# Patient Record
Sex: FEMALE | Race: WHITE | NOT HISPANIC OR LATINO | Employment: FULL TIME | URBAN - METROPOLITAN AREA
[De-identification: names, ages, dates, MRNs, and addresses within clinical notes are randomized per-mention and may not be internally consistent; named-entity substitution may affect disease eponyms.]

---

## 2017-01-10 ENCOUNTER — OFFICE VISIT (OUTPATIENT)
Dept: PEDIATRICS | Facility: CLINIC | Age: 32
End: 2017-01-10
Payer: COMMERCIAL

## 2017-01-10 VITALS
OXYGEN SATURATION: 99 % | BODY MASS INDEX: 25.04 KG/M2 | HEART RATE: 52 BPM | SYSTOLIC BLOOD PRESSURE: 116 MMHG | WEIGHT: 147 LBS | DIASTOLIC BLOOD PRESSURE: 69 MMHG | TEMPERATURE: 98.3 F

## 2017-01-10 DIAGNOSIS — L98.9 SKIN LESION: Primary | ICD-10-CM

## 2017-01-10 PROCEDURE — 11300 SHAVE SKIN LESION 0.5 CM/<: CPT | Performed by: FAMILY MEDICINE

## 2017-01-10 NOTE — MR AVS SNAPSHOT
After Visit Summary   1/10/2017    Isabel Anthony    MRN: [id number]           Patient Information     Date Of Birth          1985        Visit Information        Provider Department      1/10/2017 8:40 AM Kary Rosen MD; PROC RM 1 [facility]        Today's Diagnoses     Skin lesion    -  1        Follow-ups after your visit        Who to contact     If you have questions or need follow up information about today's clinic visit or your schedule please contact [facility] directly at [phone].  Normal or non-critical lab and imaging results will be communicated to you by MyChart, letter or phone within 4 business days after the clinic has received the results. If you do not hear from us within 7 days, please contact the clinic through MyChart or phone. If you have a critical or abnormal lab result, we will notify you by phone as soon as possible.  Submit refill requests through [employer] or call your pharmacy and they will forward the refill request to us. Please allow 3 business days for your refill to be completed.          Additional Information About Your Visit        MyChart Information     [employer] is an electronic gateway that provides easy, online access to your medical records. With [employer], you can request a clinic appointment, read your test results, renew a prescription or communicate with your care team.     To sign up for [employer] visit the website at [email or web address]   You will be asked to enter the access code listed below, as well as some personal information. Please follow the directions to create your username and password.     Your access code is: [email or web address]  Expires: 4/10/2017  9:15 AM     Your access code will  in 90 days. If you need help or a new code, please contact your [facility] or call [phone] for assistance.        Care EveryWhere ID     This is your Care EveryWhere  ID. This could be used by other organizations to access your [facility] medical records  [id number]        Your Vitals Were     Pulse Temperature Pulse Oximetry             52 98.3  F (36.8  C) (Oral) 99%          Blood Pressure from Last 3 Encounters:   01/10/17 116/69   12/26/16 106/64   01/26/16 120/78    Weight from Last 3 Encounters:   01/10/17 147 lb (66.679 kg)   12/26/16 147 lb 1.6 oz (66.724 kg)   01/26/16 141 lb 8 oz (64.184 kg)              We Performed the Following     SHAV SKIN LESION TRUNK/ARM/LEG <=0.5 CM        Primary Care Provider Office Phone # Fax #    Kary Rosen [phone]       [facility] [id number]  [facility] [address]        Thank you!     Thank you for choosing [facility]  for your care. Our goal is always to provide you with excellent care. Hearing back from our patients is one way we can continue to improve our services. Please take a few minutes to complete the written survey that you may receive in the mail after your visit with us. Thank you!             Your Updated Medication List - Protect others around you: Learn how to safely use, store and throw away your medicines at www.disposemymeds.org.          This list is accurate as of: 1/10/17  9:15 AM.  Always use your most recent med list.                   Brand Name Dispense Instructions for use    MAGNESIUM OXIDE PO      Take 500 mg by mouth daily       OMEGA-3 FISH OIL PO      Take 2 g by mouth daily       paragard intrauterine copper      1 each by Intrauterine route once Placed 5/2010 per patient

## 2017-01-10 NOTE — NURSING NOTE
"Chief Complaint   Patient presents with     Lesion Removal       Initial /69 mmHg  Pulse 52  Temp(Src) 98.3  F (36.8  C) (Oral)  Wt 147 lb (66.679 kg)  SpO2 99% Estimated body mass index is 25.04 kg/(m^2) as calculated from the following:    Height as of 1/26/16: 5' 4.25\" (1.632 m).    Weight as of this encounter: 147 lb (66.679 kg).  BP completed using cuff size: rula Gutierrez CMA      "

## 2017-01-10 NOTE — PROGRESS NOTES
SUBJECTIVE:                                                    Isabel Anthony is a 31 year old female who presents to clinic today for the following health issues:      Lesion Removal from left breast  Patient is here for removal of irritating skin lesion over the skin of the left breast        Problem list and histories reviewed & adjusted, as indicated.  Additional history: as documented    Patient Active Problem List   Diagnosis     CARDIOVASCULAR SCREENING; LDL GOAL LESS THAN 160     Surveillance of intrauterine contraceptive device, -PARAGARD, May, 2010     Other fatigue     Low back strain, initial encounter     Family history of heart disease     Past Surgical History   Procedure Laterality Date     Hc tooth extraction w/forcep         Social History   Substance Use Topics     Smoking status: Never Smoker      Smokeless tobacco: Never Used     Alcohol Use: 0.0 oz/week     0 Standard drinks or equivalent per week      Comment: occasional/social     Family History   Problem Relation Age of Onset     Coronary Artery Disease Early Onset Father      at age 48     Other Cancer Paternal Grandfather      bladder cancer     Prostate Cancer Maternal Grandfather      Other Cancer Maternal Aunt      cervical cancer      Other Cancer Maternal Aunt      cervical cancer     Thyroid Disease Paternal Grandmother          Current Outpatient Prescriptions   Medication Sig Dispense Refill     paragard intrauterine copper 1 each by Intrauterine route once Placed 5/2010 per patient       Omega-3 Fatty Acids (OMEGA-3 FISH OIL PO) Take 2 g by mouth daily        MAGNESIUM OXIDE PO Take 500 mg by mouth daily        Allergies   Allergen Reactions     Amoxicillin Rash     Reaction as infant     Recent Labs   Lab Test  01/26/16   0810   LDL  113*   HDL  49*   TRIG  50   TSH  1.55      BP Readings from Last 3 Encounters:   01/10/17 116/69   12/26/16 106/64   01/26/16 120/78    Wt Readings from Last 3 Encounters:   01/10/17 147 lb  (66.679 kg)   12/26/16 147 lb 1.6 oz (66.724 kg)   01/26/16 141 lb 8 oz (64.184 kg)                  Labs reviewed in EPIC  Problem list, Medication list, Allergies, and Medical/Social/Surgical histories reviewed in [facility] and updated as appropriate.    ROS:  C: NEGATIVE for fever, chills, change in weight  INTEGUMENTARY/SKIN: as above    OBJECTIVE:                                                    /69 mmHg  Pulse 52  Temp(Src) 98.3  F (36.8  C) (Oral)  Wt 147 lb (66.679 kg)  SpO2 99%  Body mass index is 25.04 kg/(m^2).  GENERAL: healthy, alert and no distress  SKIN: 3 mm skin-colored, fleshy skin lesion over the skin of the upper quadrant of left breast    Diagnostic Test Results:  none      ASSESSMENT/PLAN:                                                            1. Skin lesion  Left breast skin- 3mm  After explaining the risks and benefits of the procedure and after getting written consent under aseptic precuations and local anesthesia with 0.5cc of 2% lidocaine and epinephrine, the lesion was removed with a shave blade.   Local bleeding was controlled with topical drysol.   Wound was dressed with bacitracin.   Wound care instructions given.rtc for concerns of cutaneous infection.  patient tolerated the procedure well.  No complications noted    - SHAV SKIN LESION TRUNK/ARM/LEG <=0.5 CM    Chart documentation done in part with Dragon Voice recognition Software. Although reviewed after completion, some word and grammatical error may remain.    See Patient Instructions    Kary Rosen MD  [facility]

## 2020-07-31 ENCOUNTER — MYC MEDICAL ADVICE (OUTPATIENT)
Dept: PEDIATRICS | Facility: CLINIC | Age: 35
End: 2020-07-31

## 2020-07-31 NOTE — TELEPHONE ENCOUNTER
Please assist with scheduling when able.    Leslie Scott, RN, BSN, PHN  [facility]

## 2020-08-11 ENCOUNTER — OFFICE VISIT (OUTPATIENT)
Dept: PEDIATRICS | Facility: CLINIC | Age: 35
End: 2020-08-11
Payer: COMMERCIAL

## 2020-08-11 VITALS
HEIGHT: 64 IN | DIASTOLIC BLOOD PRESSURE: 66 MMHG | WEIGHT: 158.1 LBS | HEART RATE: 54 BPM | TEMPERATURE: 98.6 F | BODY MASS INDEX: 26.99 KG/M2 | OXYGEN SATURATION: 99 % | SYSTOLIC BLOOD PRESSURE: 124 MMHG

## 2020-08-11 DIAGNOSIS — Z30.431 ENCOUNTER FOR MANAGEMENT OF INTRAUTERINE CONTRACEPTIVE DEVICE (IUD), UNSPECIFIED IUD MANAGEMENT TYPE: ICD-10-CM

## 2020-08-11 DIAGNOSIS — L68.0 HIRSUTISM: Primary | ICD-10-CM

## 2020-08-11 PROCEDURE — 99203 OFFICE O/P NEW LOW 30 MIN: CPT | Performed by: FAMILY MEDICINE

## 2020-08-11 ASSESSMENT — MIFFLIN-ST. JEOR: SCORE: 1403.17

## 2020-08-11 ASSESSMENT — PAIN SCALES - GENERAL: PAINLEVEL: NO PAIN (0)

## 2020-08-11 NOTE — PROGRESS NOTES
Subjective     Isabel Anthony is a 35 year old female who presents to clinic today for the following health issues:    HPI       Establish Care - re-establish care, IUD consult and discuss labs for hormones due to increase exhaustion in last couple of years    Patient was last seen by this writer in 2017  Her last physical was in 2016  Patient is  2 para 2, last childbirth-10 years ago, had a ParaGard IUD since then  She is going through divorce, has been  from her  for the past 2 years  Patient is wishing to have another ParaGard inserted for contraception  She is also having concerns with excessive hair growth on the sideburns, chin and having very thick coarse hair around the nipple and on the upper extremities  Patient is worried about having hormonal imbalance and wants to get hormonal check done  She denies having personal history of thyroid disorder but does have family history of hypothyroidism in mother  Patient denies change in voice, acne problems  She has been having regular menstrual cycles every 4 weeks,.  Lasting for 5 to 7 days with heavy flow during the first 4 days of the cycle associated with pelvic cramping due to the  ParaGard IUD  Patient denies underlying history of polycystic ovarian disease            Patient Active Problem List   Diagnosis     CARDIOVASCULAR SCREENING; LDL GOAL LESS THAN 160     Surveillance of intrauterine contraceptive device, -PARAGARD, May, 2010     Other fatigue     Low back strain, initial encounter     Family history of heart disease     Hirsutism     Past Surgical History:   Procedure Laterality Date     HC TOOTH EXTRACTION W/FORCEP         Social History     Tobacco Use     Smoking status: Never Smoker     Smokeless tobacco: Never Used   Substance Use Topics     Alcohol use: Yes     Alcohol/week: 0.0 standard drinks     Comment: occasional/social     Family History   Problem Relation Age of Onset     Coronary Artery Disease Early Onset  "Father         at age 48     Other Cancer Paternal Grandfather         bladder cancer     Prostate Cancer Maternal Grandfather      Other Cancer Maternal Aunt         cervical cancer      Other Cancer Maternal Aunt         cervical cancer     Thyroid Disease Paternal Grandmother          Current Outpatient Medications   Medication Sig Dispense Refill     MAGNESIUM OXIDE PO Take 500 mg by mouth daily        Omega-3 Fatty Acids (OMEGA-3 FISH OIL PO) Take 2 g by mouth daily        paragard intrauterine copper 1 each by Intrauterine route once Placed 5/2010 per patient       Allergies   Allergen Reactions     Amoxicillin Rash     Reaction as infant     Recent Labs   Lab Test 01/26/16  0810   *   HDL 49*   TRIG 50   TSH 1.55      BP Readings from Last 3 Encounters:   08/11/20 124/66   01/10/17 116/69   12/26/16 106/64    Wt Readings from Last 3 Encounters:   08/11/20 71.7 kg (158 lb 1.6 oz)   01/10/17 66.7 kg (147 lb)   12/26/16 66.7 kg (147 lb 1.6 oz)                    Reviewed and updated as needed this visit by Provider         Review of Systems   CONSTITUTIONAL: NEGATIVE for fever, chills, change in weight  INTEGUMENTARY/SKIN: as above  RESP: NEGATIVE for significant cough or SOB  CV: NEGATIVE for chest pain, palpitations or peripheral edema  GI: NEGATIVE for nausea, abdominal pain, heartburn, or change in bowel habits  : normal menstrual cycles  MUSCULOSKELETAL: NEGATIVE for significant arthralgias or myalgia  ENDOCRINE: NEGATIVE for temperature intolerance, skin/hair changes  PSYCHIATRIC: NEGATIVE for changes in mood or affect      Objective    /66 (BP Location: Right arm, Patient Position: Sitting, Cuff Size: Adult Regular)   Pulse 54   Temp 98.6  F (37  C) (Temporal)   Ht 1.619 m (5' 3.75\")   Wt 71.7 kg (158 lb 1.6 oz)   LMP 08/05/2020   SpO2 99%   BMI 27.35 kg/m    Body mass index is 27.35 kg/m .  Physical Exam   GENERAL: healthy, alert and no distress  NECK: no adenopathy, no asymmetry, " "masses, or scars and thyroid normal to palpation  RESP: lungs clear to auscultation - no rales, rhonchi or wheezes  CV: regular rate and rhythm, normal S1 S2, no S3 or S4, no murmur, click or rub, no peripheral edema and peripheral pulses strong  ABDOMEN: soft, nontender, no hepatosplenomegaly, no masses and bowel sounds normal  MS: no gross musculoskeletal defects noted, no edema  SKIN: Coarse thick hair on the upper extremities  Patient has shaved her sideburn and chin  PSYCH: mentation appears normal, affect normal/bright    Diagnostic Test Results:  Labs reviewed in Epic        Assessment & Plan     1. Hirsutism  Patient is overdue for a physical, she is coming to have the physical, pelvic exam and Pap next week   will get labs for hirsutism work-up along with a fasting labs at that time  Will f/u on results and call with recommendations.  Patient verbalised understanding and is agreeable to the plan.        2. Encounter for management of intrauterine contraceptive device (IUD), unspecified IUD management type  She is currently having ParaGard IUD  Patient's LMP is a week ago  She will schedule for IUD removal and reinsertion on the fifth or sixth the day of her next menstrual cycle       BMI:   Estimated body mass index is 27.35 kg/m  as calculated from the following:    Height as of this encounter: 1.619 m (5' 3.75\").    Weight as of this encounter: 71.7 kg (158 lb 1.6 oz).   Weight management plan: Discussed healthy diet and exercise guidelines        Regular exercise  Chart documentation done in part with Dragon Voice recognition Software. Although reviewed after completion, some word and grammatical error may remain.    See Patient Instructions    No follow-ups on file.    Kary Rosen MD  [facility]    "

## 2020-08-16 DIAGNOSIS — Z00.00 ROUTINE GENERAL MEDICAL EXAMINATION AT A HEALTH CARE FACILITY: Primary | ICD-10-CM

## 2020-08-16 DIAGNOSIS — Z13.0 SCREENING FOR DEFICIENCY ANEMIA: ICD-10-CM

## 2020-08-16 DIAGNOSIS — Z13.29 SCREENING FOR THYROID DISORDER: ICD-10-CM

## 2020-08-16 DIAGNOSIS — Z13.6 CARDIOVASCULAR SCREENING; LDL GOAL LESS THAN 160: ICD-10-CM

## 2020-08-16 DIAGNOSIS — Z13.1 SCREENING FOR DIABETES MELLITUS (DM): ICD-10-CM

## 2020-08-16 DIAGNOSIS — L68.0 HIRSUTISM: ICD-10-CM

## 2020-08-19 ENCOUNTER — OFFICE VISIT (OUTPATIENT)
Dept: PEDIATRICS | Facility: CLINIC | Age: 35
End: 2020-08-19
Payer: COMMERCIAL

## 2020-08-19 VITALS
DIASTOLIC BLOOD PRESSURE: 81 MMHG | WEIGHT: 156.3 LBS | TEMPERATURE: 97.4 F | OXYGEN SATURATION: 98 % | SYSTOLIC BLOOD PRESSURE: 120 MMHG | BODY MASS INDEX: 27.04 KG/M2 | HEART RATE: 55 BPM

## 2020-08-19 DIAGNOSIS — Z00.00 ROUTINE GENERAL MEDICAL EXAMINATION AT A HEALTH CARE FACILITY: Primary | ICD-10-CM

## 2020-08-19 DIAGNOSIS — Z13.29 SCREENING FOR THYROID DISORDER: ICD-10-CM

## 2020-08-19 DIAGNOSIS — Z13.1 SCREENING FOR DIABETES MELLITUS (DM): ICD-10-CM

## 2020-08-19 DIAGNOSIS — Z13.6 CARDIOVASCULAR SCREENING; LDL GOAL LESS THAN 160: ICD-10-CM

## 2020-08-19 DIAGNOSIS — D22.9 MULTIPLE PIGMENTED NEVI: ICD-10-CM

## 2020-08-19 DIAGNOSIS — Z30.431 SURVEILLANCE OF INTRAUTERINE CONTRACEPTIVE DEVICE: ICD-10-CM

## 2020-08-19 DIAGNOSIS — Z12.4 CERVICAL CANCER SCREENING: ICD-10-CM

## 2020-08-19 DIAGNOSIS — L68.0 HIRSUTISM: ICD-10-CM

## 2020-08-19 DIAGNOSIS — Z13.0 SCREENING FOR DEFICIENCY ANEMIA: ICD-10-CM

## 2020-08-19 DIAGNOSIS — Z00.00 ROUTINE GENERAL MEDICAL EXAMINATION AT A HEALTH CARE FACILITY: ICD-10-CM

## 2020-08-19 DIAGNOSIS — Z11.4 SCREENING FOR HUMAN IMMUNODEFICIENCY VIRUS: ICD-10-CM

## 2020-08-19 LAB
ALBUMIN SERPL-MCNC: 4.3 G/DL (ref 3.4–5)
ALP SERPL-CCNC: 46 U/L (ref 40–150)
ALT SERPL W P-5'-P-CCNC: 36 U/L (ref 0–50)
ANION GAP SERPL CALCULATED.3IONS-SCNC: 4 MMOL/L (ref 3–14)
AST SERPL W P-5'-P-CCNC: 23 U/L (ref 0–45)
BASOPHILS # BLD AUTO: 0 10E9/L (ref 0–0.2)
BASOPHILS NFR BLD AUTO: 0.5 %
BILIRUB SERPL-MCNC: 0.5 MG/DL (ref 0.2–1.3)
BUN SERPL-MCNC: 14 MG/DL (ref 7–30)
CALCIUM SERPL-MCNC: 8.6 MG/DL (ref 8.5–10.1)
CHLORIDE SERPL-SCNC: 104 MMOL/L (ref 94–109)
CHOLEST SERPL-MCNC: 190 MG/DL
CO2 SERPL-SCNC: 29 MMOL/L (ref 20–32)
CREAT SERPL-MCNC: 0.92 MG/DL (ref 0.52–1.04)
DHEA-S SERPL-MCNC: 237 UG/DL (ref 35–430)
DIFFERENTIAL METHOD BLD: NORMAL
EOSINOPHIL # BLD AUTO: 0 10E9/L (ref 0–0.7)
EOSINOPHIL NFR BLD AUTO: 0.4 %
ERYTHROCYTE [DISTWIDTH] IN BLOOD BY AUTOMATED COUNT: 12.3 % (ref 10–15)
FSH SERPL-ACNC: 3.2 IU/L
GFR SERPL CREATININE-BSD FRML MDRD: 81 ML/MIN/{1.73_M2}
GLUCOSE SERPL-MCNC: 92 MG/DL (ref 70–99)
HCT VFR BLD AUTO: 39 % (ref 35–47)
HDLC SERPL-MCNC: 51 MG/DL
HGB BLD-MCNC: 13.3 G/DL (ref 11.7–15.7)
HIV 1+2 AB+HIV1 P24 AG SERPL QL IA: NONREACTIVE
IMM GRANULOCYTES # BLD: 0 10E9/L (ref 0–0.4)
IMM GRANULOCYTES NFR BLD: 0.4 %
LDLC SERPL CALC-MCNC: 123 MG/DL
LH SERPL-ACNC: 7.9 IU/L
LYMPHOCYTES # BLD AUTO: 1.9 10E9/L (ref 0.8–5.3)
LYMPHOCYTES NFR BLD AUTO: 33.5 %
MCH RBC QN AUTO: 30.9 PG (ref 26.5–33)
MCHC RBC AUTO-ENTMCNC: 34.1 G/DL (ref 31.5–36.5)
MCV RBC AUTO: 91 FL (ref 78–100)
MONOCYTES # BLD AUTO: 0.5 10E9/L (ref 0–1.3)
MONOCYTES NFR BLD AUTO: 8.4 %
NEUTROPHILS # BLD AUTO: 3.2 10E9/L (ref 1.6–8.3)
NEUTROPHILS NFR BLD AUTO: 56.8 %
NONHDLC SERPL-MCNC: 139 MG/DL
PLATELET # BLD AUTO: 197 10E9/L (ref 150–450)
POTASSIUM SERPL-SCNC: 4 MMOL/L (ref 3.4–5.3)
PROLACTIN SERPL-MCNC: 8 UG/L (ref 3–27)
PROT SERPL-MCNC: 7.2 G/DL (ref 6.8–8.8)
RBC # BLD AUTO: 4.31 10E12/L (ref 3.8–5.2)
SODIUM SERPL-SCNC: 137 MMOL/L (ref 133–144)
TRIGL SERPL-MCNC: 80 MG/DL
TSH SERPL DL<=0.005 MIU/L-ACNC: 2.14 MU/L (ref 0.4–4)
WBC # BLD AUTO: 5.6 10E9/L (ref 4–11)

## 2020-08-19 PROCEDURE — 87624 HPV HI-RISK TYP POOLED RSLT: CPT | Performed by: FAMILY MEDICINE

## 2020-08-19 PROCEDURE — 83001 ASSAY OF GONADOTROPIN (FSH): CPT | Performed by: FAMILY MEDICINE

## 2020-08-19 PROCEDURE — 87389 HIV-1 AG W/HIV-1&-2 AB AG IA: CPT | Performed by: FAMILY MEDICINE

## 2020-08-19 PROCEDURE — G0145 SCR C/V CYTO,THINLAYER,RESCR: HCPCS | Performed by: FAMILY MEDICINE

## 2020-08-19 PROCEDURE — 84270 ASSAY OF SEX HORMONE GLOBUL: CPT | Performed by: FAMILY MEDICINE

## 2020-08-19 PROCEDURE — 80050 GENERAL HEALTH PANEL: CPT | Performed by: FAMILY MEDICINE

## 2020-08-19 PROCEDURE — 83002 ASSAY OF GONADOTROPIN (LH): CPT | Performed by: FAMILY MEDICINE

## 2020-08-19 PROCEDURE — 80061 LIPID PANEL: CPT | Performed by: FAMILY MEDICINE

## 2020-08-19 PROCEDURE — 36415 COLL VENOUS BLD VENIPUNCTURE: CPT | Performed by: FAMILY MEDICINE

## 2020-08-19 PROCEDURE — 84146 ASSAY OF PROLACTIN: CPT | Performed by: FAMILY MEDICINE

## 2020-08-19 PROCEDURE — 82627 DEHYDROEPIANDROSTERONE: CPT | Performed by: FAMILY MEDICINE

## 2020-08-19 PROCEDURE — 84403 ASSAY OF TOTAL TESTOSTERONE: CPT | Performed by: FAMILY MEDICINE

## 2020-08-19 PROCEDURE — 99395 PREV VISIT EST AGE 18-39: CPT | Performed by: FAMILY MEDICINE

## 2020-08-19 ASSESSMENT — PAIN SCALES - GENERAL: PAINLEVEL: NO PAIN (0)

## 2020-08-19 NOTE — RESULT ENCOUNTER NOTE
Dear Isabel,  Your test results for HIV screening is negative, this is good and reassuring.   Let me know if you have any questions. Take care.  Kary Rosen MD

## 2020-08-19 NOTE — PATIENT INSTRUCTIONS
Schedule for skin consult      Preventive Health Recommendations  Female Ages 26 - 39  Yearly exam:   See your health care provider every year in order to    Review health changes.     Discuss preventive care.      Review your medicines if you your doctor has prescribed any.    Until age 30: Get a Pap test every three years (more often if you have had an abnormal result).    After age 30: Talk to your doctor about whether you should have a Pap test every 3 years or have a Pap test with HPV screening every 5 years.   You do not need a Pap test if your uterus was removed (hysterectomy) and you have not had cancer.  You should be tested each year for STDs (sexually transmitted diseases), if you're at risk.   Talk to your provider about how often to have your cholesterol checked.  If you are at risk for diabetes, you should have a diabetes test (fasting glucose).  Shots: Get a flu shot each year. Get a tetanus shot every 10 years.   Nutrition:     Eat at least 5 servings of fruits and vegetables each day.    Eat whole-grain bread, whole-wheat pasta and brown rice instead of white grains and rice.    Get adequate Calcium and Vitamin D.     Lifestyle    Exercise at least 150 minutes a week (30 minutes a day, 5 days of the week). This will help you control your weight and prevent disease.    Limit alcohol to one drink per day.    No smoking.     Wear sunscreen to prevent skin cancer.    See your dentist every six months for an exam and cleaning.

## 2020-08-19 NOTE — PROGRESS NOTES
SUBJECTIVE:   CC: Isabel Anthony is an 35 year old woman who presents for preventive health visit.     Healthy Habits:    Do you get at least three servings of calcium containing foods daily (dairy, green leafy vegetables, etc.)? yes    Amount of exercise or daily activities, outside of work: 2-3 day(s) per week    Problems taking medications regularly No    Medication side effects: No    Have you had an eye exam in the past two years? no    Do you see a dentist twice per year? yes    Do you have sleep apnea, excessive snoring or daytime drowsiness?no      -------------------------------------    Today's PHQ-2 Score:   PHQ-2 ( 1999 [employer]) 8/11/2020 1/26/2016   Q1: Little interest or pleasure in doing things 0 0   Q2: Feeling down, depressed or hopeless 0 0   PHQ-2 Score 0 0       Abuse: Current or Past(Physical, Sexual or Emotional)- No  Do you feel safe in your environment? Yes        Social History     Tobacco Use     Smoking status: Never Smoker     Smokeless tobacco: Never Used   Substance Use Topics     Alcohol use: Yes     Alcohol/week: 0.0 standard drinks     Comment: occasional/social     If you drink alcohol do you typically have >3 drinks per day or >7 drinks per week? No                     Reviewed orders with patient.  Reviewed health maintenance and updated orders accordingly - Yes  Lab work is in process  Labs reviewed in EPIC  BP Readings from Last 3 Encounters:   08/19/20 120/81   08/11/20 124/66   01/10/17 116/69    Wt Readings from Last 3 Encounters:   08/19/20 70.9 kg (156 lb 4.8 oz)   08/11/20 71.7 kg (158 lb 1.6 oz)   01/10/17 66.7 kg (147 lb)                  Patient Active Problem List   Diagnosis     CARDIOVASCULAR SCREENING; LDL GOAL LESS THAN 160     Surveillance of intrauterine contraceptive device, -PARAGARD, May, 2010     Other fatigue     Low back strain, initial encounter     Family history of heart disease     Hirsutism     Past Surgical History:   Procedure Laterality Date      HC TOOTH EXTRACTION W/FORCEP         Social History     Tobacco Use     Smoking status: Never Smoker     Smokeless tobacco: Never Used   Substance Use Topics     Alcohol use: Yes     Alcohol/week: 0.0 standard drinks     Comment: occasional/social     Family History   Problem Relation Age of Onset     Coronary Artery Disease Early Onset Father         at age 48     Other Cancer Paternal Grandfather         bladder cancer     Prostate Cancer Maternal Grandfather      Other Cancer Maternal Aunt         cervical cancer      Other Cancer Maternal Aunt         cervical cancer     Thyroid Disease Paternal Grandmother          Current Outpatient Medications   Medication Sig Dispense Refill     MAGNESIUM OXIDE PO Take 500 mg by mouth daily        Omega-3 Fatty Acids (OMEGA-3 FISH OIL PO) Take 2 g by mouth daily        paragard intrauterine copper 1 each by Intrauterine route once Placed 5/2010 per patient       Allergies   Allergen Reactions     Amoxicillin Rash     Reaction as infant     Recent Labs   Lab Test 08/19/20  0933 01/26/16  0810   * 113*   HDL 51 49*   TRIG 80 50   ALT 36  --    CR 0.92  --    GFRESTIMATED 81  --    GFRESTBLACK >90  --    POTASSIUM 4.0  --    TSH 2.14 1.55        Mammogram not appropriate for this patient based on age.    Pertinent mammograms are reviewed under the imaging tab.  History of abnormal Pap smear: NO - age 30-65 PAP every 5 years with negative HPV co-testing recommended  PAP / HPV 1/26/2016   PAP NIL     Reviewed and updated as needed this visit by clinical staff         Reviewed and updated as needed this visit by Provider          Past Medical History:   Diagnosis Date     NO ACTIVE PROBLEMS       Past Surgical History:   Procedure Laterality Date     HC TOOTH EXTRACTION W/FORCEP       OB History   No obstetric history on file.       ROS:  CONSTITUTIONAL: NEGATIVE for fever, chills, change in weight  INTEGUMENTARY/SKIN: Ongoing concerns with hirsutism and multiple  pigmented nevi  EYES: NEGATIVE for vision changes or irritation  ENT: NEGATIVE for ear, mouth and throat problems  RESP: NEGATIVE for significant cough or SOB  BREAST: NEGATIVE for masses, tenderness or discharge  CV: NEGATIVE for chest pain, palpitations or peripheral edema  GI: NEGATIVE for nausea, abdominal pain, heartburn, or change in bowel habits  : NEGATIVE for unusual urinary or vaginal symptoms. Periods are regular.  MUSCULOSKELETAL: NEGATIVE for significant arthralgias or myalgia  NEURO: NEGATIVE for weakness, dizziness or paresthesias  ENDOCRINE: NEGATIVE for temperature intolerance, skin/hair changes  HEME/ALLERGY/IMMUNE: NEGATIVE for bleeding problems  PSYCHIATRIC: NEGATIVE for changes in mood or affect    OBJECTIVE:   LMP 08/05/2020   EXAM:  GENERAL: healthy, alert and no distress  EYES: Eyes grossly normal to inspection, PERRL and conjunctivae and sclerae normal  HENT: ear canals and TM's normal, nose and mouth without ulcers or lesions  NECK: no adenopathy, no asymmetry, masses, or scars and thyroid normal to palpation  RESP: lungs clear to auscultation - no rales, rhonchi or wheezes  BREAST: normal without masses, tenderness or nipple discharge and no palpable axillary masses or adenopathy  CV: regular rate and rhythm, normal S1 S2, no S3 or S4, no murmur, click or rub, no peripheral edema and peripheral pulses strong  ABDOMEN: soft, nontender, no hepatosplenomegaly, no masses and bowel sounds normal   (female): normal female external genitalia, normal urethral meatus, vaginal mucosa pink, moist, well rugated, and normal cervix/adnexa/uterus without masses or discharge   (female): Unable to see or feel IUD threads at the external cervical os  MS: no gross musculoskeletal defects noted, no edema  SKIN: Thick hair on the forearms  Multiple pigmented nevi-extremities  NEURO: Normal strength and tone, mentation intact and speech normal  PSYCH: mentation appears normal, affect  normal/bright    Diagnostic Test Results:  Labs reviewed in Epic  Results for orders placed or performed in visit on 08/19/20 (from the past 24 hour(s))   **TSH with free T4 reflex FUTURE anytime   Result Value Ref Range    TSH 2.14 0.40 - 4.00 mU/L   Lipid panel reflex to direct LDL Fasting   Result Value Ref Range    Cholesterol 190 <200 mg/dL    Triglycerides 80 <150 mg/dL    HDL Cholesterol 51 >49 mg/dL    LDL Cholesterol Calculated 123 (H) <100 mg/dL    Non HDL Cholesterol 139 (H) <130 mg/dL   **Comprehensive metabolic panel FUTURE anytime   Result Value Ref Range    Sodium 137 133 - 144 mmol/L    Potassium 4.0 3.4 - 5.3 mmol/L    Chloride 104 94 - 109 mmol/L    Carbon Dioxide 29 20 - 32 mmol/L    Anion Gap 4 3 - 14 mmol/L    Glucose 92 70 - 99 mg/dL    Urea Nitrogen 14 7 - 30 mg/dL    Creatinine 0.92 0.52 - 1.04 mg/dL    GFR Estimate 81 >60 mL/min/[1.73_m2]    GFR Estimate If Black >90 >60 mL/min/[1.73_m2]    Calcium 8.6 8.5 - 10.1 mg/dL    Bilirubin Total 0.5 0.2 - 1.3 mg/dL    Albumin 4.3 3.4 - 5.0 g/dL    Protein Total 7.2 6.8 - 8.8 g/dL    Alkaline Phosphatase 46 40 - 150 U/L    ALT 36 0 - 50 U/L    AST 23 0 - 45 U/L   CBC with platelets differential   Result Value Ref Range    WBC 5.6 4.0 - 11.0 10e9/L    RBC Count 4.31 3.8 - 5.2 10e12/L    Hemoglobin 13.3 11.7 - 15.7 g/dL    Hematocrit 39.0 35.0 - 47.0 %    MCV 91 78 - 100 fl    MCH 30.9 26.5 - 33.0 pg    MCHC 34.1 31.5 - 36.5 g/dL    RDW 12.3 10.0 - 15.0 %    Platelet Count 197 150 - 450 10e9/L    Diff Method Automated Method     % Neutrophils 56.8 %    % Lymphocytes 33.5 %    % Monocytes 8.4 %    % Eosinophils 0.4 %    % Basophils 0.5 %    % Immature Granulocytes 0.4 %    Absolute Neutrophil 3.2 1.6 - 8.3 10e9/L    Absolute Lymphocytes 1.9 0.8 - 5.3 10e9/L    Absolute Monocytes 0.5 0.0 - 1.3 10e9/L    Absolute Eosinophils 0.0 0.0 - 0.7 10e9/L    Absolute Basophils 0.0 0.0 - 0.2 10e9/L    Abs Immature Granulocytes 0.0 0 - 0.4 10e9/L        ASSESSMENT/PLAN:   1. Routine general medical examination at a health care facility  Discussed on regular exercises, daily calcium intake, healthy eating, self breast exams monthly and routine dental checks    - Pap imaged thin layer screen with HPV - recommended age 30 - 65 years (select HPV order below)  - HPV High Risk Types DNA Cervical  - HIV Antigen Antibody Combo    2. Cervical cancer screening    - Pap imaged thin layer screen with HPV - recommended age 30 - 65 years (select HPV order below)  - HPV High Risk Types DNA Cervical    3. Hirsutism  Labs are in process, recommended patient to consult dermatology for further evaluation  - DERMATOLOGY ADULT REFERRAL; Future    4. CARDIOVASCULAR SCREENING; LDL GOAL LESS THAN 160  LDL Cholesterol Calculated   Date Value Ref Range Status   08/19/2020 123 (H) <100 mg/dL Final     Comment:     Above desirable:  100-129 mg/dl  Borderline High:  130-159 mg/dL  High:             160-189 mg/dL  Very high:       >189 mg/dl           5. Surveillance of intrauterine contraceptive device, -PARAGARD, May, 2010  Reviewed my concern about not feeling the IUD threads at the external cervical loss recommended patient to see OB/GYN for IUD Replacement  Patient is still not sure if she wants to proceed with Mirena versus ParaGard, she wants to wait for the hormone labs before deciding    6. Screening for human immunodeficiency virus    - HIV Antigen Antibody Combo    7. Multiple pigmented nevi   Reviewed the ABCD's of pigmented nevi, emphasized on sunscreen use, return to clinic for concerns.    - DERMATOLOGY ADULT REFERRAL; Future    COUNSELING:   Reviewed preventive health counseling, as reflected in patient instructions  Special attention given to:        Regular exercise       Healthy diet/nutrition       Vision screening       Contraception       Family planning       Folic Acid Counseling       Safe sex practices/STD prevention    Estimated body mass index is 27.35 kg/m  as  "calculated from the following:    Height as of 8/11/20: 1.619 m (5' 3.75\").    Weight as of 8/11/20: 71.7 kg (158 lb 1.6 oz).    Weight management plan: Discussed healthy diet and exercise guidelines     reports that she has never smoked. She has never used smokeless tobacco.      Counseling Resources:  ATP IV Guidelines  Pooled Cohorts Equation Calculator  Breast Cancer Risk Calculator  FRAX Risk Assessment  ICSI Preventive Guidelines  Dietary Guidelines for Americans, 2010  USDA's MyPlate  ASA Prophylaxis  Lung CA Screening    Kary Rosen MD  [facility]  Chart documentation done in part with Dragon Voice recognition Software. Although reviewed after completion, some word and grammatical error may remain.    "

## 2020-08-20 LAB
SHBG SERPL-SCNC: 32 NMOL/L (ref 30–135)
TESTOST FREE SERPL-MCNC: 0.43 NG/DL (ref 0.13–0.92)
TESTOST SERPL-MCNC: 24 NG/DL (ref 8–60)

## 2020-08-21 LAB
COPATH REPORT: NORMAL
PAP: NORMAL

## 2020-08-21 NOTE — RESULT ENCOUNTER NOTE
Dear Isabel,  Your hormone labs are all in normal and excellent range, this is reassuring.  Please call to schedule for Gyn  follow-up for the IUD and skin consult for the hair growth.  Let me know if you have any questions. Take care.  Kary Rosen MD

## 2020-08-25 LAB
FINAL DIAGNOSIS: ABNORMAL
HPV HR 12 DNA CVX QL NAA+PROBE: POSITIVE
HPV16 DNA SPEC QL NAA+PROBE: NEGATIVE
HPV18 DNA SPEC QL NAA+PROBE: NEGATIVE
SPECIMEN DESCRIPTION: ABNORMAL
SPECIMEN SOURCE CVX/VAG CYTO: ABNORMAL

## 2020-08-27 ENCOUNTER — MYC MEDICAL ADVICE (OUTPATIENT)
Dept: PEDIATRICS | Facility: CLINIC | Age: 35
End: 2020-08-27

## 2020-08-28 ENCOUNTER — PATIENT OUTREACH (OUTPATIENT)
Dept: PEDIATRICS | Facility: CLINIC | Age: 35
End: 2020-08-28

## 2020-08-28 NOTE — TELEPHONE ENCOUNTER
Please inform patient that lab results will be reviewed and relayed to patient by the Pap tracking team, who will reach out to patient with results either by phone or through my chart

## 2020-08-28 NOTE — TELEPHONE ENCOUNTER
1/26/16 NIL pap  8/19/20 NIL pap, + HR HPV (not 16 or 18). Plan: cotest due in 1 yr.   8/28/20 left [facility] and sent my chart result note.

## 2020-08-28 NOTE — TELEPHONE ENCOUNTER
Please see pap result note  8/19/20 NIL pap, + HR HPV (not 16 or 18). Plan: cotest due in 1 yr.   8/28/20 left msg and sent my chart result note.    Pap RN will await patient's call back.   YOSVANY MasseyN, RN-BC

## 2020-09-02 ENCOUNTER — NURSE TRIAGE (OUTPATIENT)
Dept: NURSING | Facility: CLINIC | Age: 35
End: 2020-09-02

## 2020-09-02 NOTE — TELEPHONE ENCOUNTER
To have IUD replaced. Does a midwife do that? She has an appointment tomorrow. I don't know the midwife's scope of practice so I connected the patient with the [facility] to find out.  Katie Persaud RN  [facility]    Additional Information    Negative: Nursing judgment    Negative: Nursing judgment    Negative: Nursing judgment    Nursing judgment    Protocols used: NO PROTOCOL AVAILABLE - INFORMATION ONLY-A-[place]

## 2020-09-03 ENCOUNTER — OFFICE VISIT (OUTPATIENT)
Dept: OBGYN | Facility: CLINIC | Age: 35
End: 2020-09-03
Payer: COMMERCIAL

## 2020-09-03 VITALS
TEMPERATURE: 98.1 F | DIASTOLIC BLOOD PRESSURE: 80 MMHG | BODY MASS INDEX: 27.24 KG/M2 | WEIGHT: 157.44 LBS | SYSTOLIC BLOOD PRESSURE: 131 MMHG | HEART RATE: 61 BPM

## 2020-09-03 DIAGNOSIS — Z30.430 ENCOUNTER FOR IUD INSERTION: ICD-10-CM

## 2020-09-03 DIAGNOSIS — Z97.5 IUD (INTRAUTERINE DEVICE) IN PLACE: ICD-10-CM

## 2020-09-03 DIAGNOSIS — Z30.432 ENCOUNTER FOR IUD REMOVAL: Primary | ICD-10-CM

## 2020-09-03 DIAGNOSIS — T83.32XD INTRAUTERINE CONTRACEPTIVE DEVICE THREADS LOST, SUBSEQUENT ENCOUNTER: ICD-10-CM

## 2020-09-03 PROCEDURE — 58301 REMOVE INTRAUTERINE DEVICE: CPT | Performed by: ADVANCED PRACTICE MIDWIFE

## 2020-09-03 PROCEDURE — 58300 INSERT INTRAUTERINE DEVICE: CPT | Performed by: ADVANCED PRACTICE MIDWIFE

## 2020-09-03 NOTE — PROGRESS NOTES
CC/HPI: Isabel Anthony is a 35 year old who presents to the clinic for an IUD removal and insertion.   Patient's last menstrual period was 09/03/2020 (exact date)..   Current birth control method: IUD paragard.  States that at her pap recently her IUD strings were not present  Indication for insertion:   birth control and cycle control/menorrhagia   Patient has been provided with written information.  I have reviewed the risks of the IUD including pregnancy, PID, life threatening infection, perforation, expulsion, cramping, changes in bleeding and ovarian cysts. Benefits of the IUD and alternative birth control and cycle control methods have been discussed.  Patients questions have been answered.  Patient has verbalized understanding of risks and benefits and has signed the consent form.    Allergies   Allergen Reactions     Amoxicillin Rash     Reaction as infant     Current Outpatient Medications   Medication Sig Dispense Refill     paragard intrauterine copper 1 each by Intrauterine route once Placed 5/2010 per patient        Past Medical History:   Diagnosis Date     NO ACTIVE PROBLEMS      Family History   Problem Relation Age of Onset     Coronary Artery Disease Early Onset Father         at age 48     Other Cancer Paternal Grandfather         bladder cancer     Prostate Cancer Maternal Grandfather      Other Cancer Maternal Aunt         cervical cancer      Other Cancer Maternal Aunt         cervical cancer     Thyroid Disease Paternal Grandmother      Social History     Socioeconomic History     Marital status:      Spouse name: Not on file     Number of children: Not on file     Years of education: Not on file     Highest education level: Not on file   Occupational History     Not on file   Social Needs     Financial resource strain: Not on file     Food insecurity     Worry: Not on file     Inability: Not on file     Transportation needs     Medical: Not on file     Non-medical: Not on file    Tobacco Use     Smoking status: Never Smoker     Smokeless tobacco: Never Used   Substance and Sexual Activity     Alcohol use: Yes     Alcohol/week: 0.0 standard drinks     Comment: occasional/social     Drug use: No     Sexual activity: Yes     Partners: Male     Birth control/protection: I.U.D.   Lifestyle     Physical activity     Days per week: Not on file     Minutes per session: Not on file     Stress: Not on file   Relationships     Social connections     Talks on phone: Not on file     Gets together: Not on file     Attends Confucianist service: Not on file     Active member of club or organization: Not on file     Attends meetings of clubs or organizations: Not on file     Relationship status: Not on file     Intimate partner violence     Fear of current or ex partner: Not on file     Emotionally abused: Not on file     Physically abused: Not on file     Forced sexual activity: Not on file   Other Topics Concern     Parent/sibling w/ CABG, MI or angioplasty before 65F 55M? Yes   Social History Narrative     Not on file     Past Surgical History:   Procedure Laterality Date     HC TOOTH EXTRACTION W/FORCEP           EXAM:  /80 (BP Location: Right arm, Patient Position: Sitting, Cuff Size: Adult Regular)   Pulse 61   Temp 98.1  F (36.7  C) (Tympanic)   Wt 71.4 kg (157 lb 7 oz)   LMP 09/03/2020 (Exact Date)   BMI 27.24 kg/m    PELVIC EXAM:  Vulva: No external lesions, normal hair distribution, no adenopathy, BUS WNL  Vagina: Moist, pink, no abnormal discharge, well rugated, no lesions  Cervix: smooth, pink, no visible lesions, neg CMT The IUD strings were not identified at the cervical os.  The cervix was explored with a polypectomy forceps, the strings were located and with gentle steady traction the IUD was removed from the uterus intact and disposed of following the hazardous waste guidelines.     Uterus: Normal size, Retroverted , non-tender, mobile  Ovaries: No mass, non-tender, mobile  Rectal  exam: deferred    IUD type: Mirena  Lot # [id number]  NDC# 32337-899-47 Mirena    EXP DATE:   10/22        Procedure:  Uterus assessed for position and is Retroverted.  Speculum inserted.  Betadine prep of cervix done.  Tenaculum applied at  10/2  o'clock position and gentle traction was appiled to elongate the cervical canal.  Uterus sounded to 8 cm's.   IUD inserted in the usual fashion according to manufacture's instructions, without significant resistance, severe protracted pain or excessive bleeding. The tenaculum was removed with scant bleeding from the puncture sites   Strings trimmed to 3 cm's.  Patient  tolerated the procedure well without any prolonged pain or syncopy.    ASSESSMENT/ PLAN:  (Z30.432) Encounter for IUD removal  (primary encounter diagnosis)  Comment:   Plan: REMOVAL NON-BIODEGRADABLE DRUG DELIVERY IMPLANT            (Z30.430) Encounter for IUD insertion  Comment:   Plan: levonorgestrel (MIRENA) 20 MCG/24HR IUD 20 mcg,        INSERTION INTRAUTERINE DEVICE, HC         LEVONORGESTREL IU 52MG 5 YR            (Z97.5) IUD (intrauterine device) in place  Comment:   Plan: levonorgestrel (MIRENA) 20 MCG/24HR IUD 20 mcg,        INSERTION INTRAUTERINE DEVICE, HC         LEVONORGESTREL IU 52MG 5 YR            (T83.32XD) Intrauterine contraceptive device threads lost, subsequent encounter  Comment:   Plan: REMOVAL NON-BIODEGRADABLE DRUG DELIVERY IMPLANT                Instructions given to patient regarding checking IUD strings, returning to the clinic if pain, fever, unusual vaginal discharge or inability to check strings and/or irregular bleeding and avoiding placing anything in her vagina for the next 24 hours.  BUM of birth control not indicated   Return to the clinic in 4-6 weeks for IUD follow up   See AVS for complete instructions

## 2020-09-03 NOTE — PATIENT INSTRUCTIONS
.What Mirena Users May Expect    What to watch for right after Mirena is placed  Some women may experience uterine cramps, bleeding, and/or dizziness during and right after Mirena is placed. To help minimize the cramps, you may taken ibuprofen 600 mg with food prior to and every 6 hours after your appointment if needed. These symptoms should improve over the next 24 hours.  Mild cramping may be present for a few days after your placement  As a follow up, you should visit your clinic once in the first 4 to 12 weeks after Mirena is placed to make sure it is in the right position. After that, Mirena can be checked once a year as part of your routine exam.    Please use a back-up method (abstinence or condoms) for 7 days after placement. Unless instructed differently at your appointment    Your periods may change  For the first 3 to 6 months, your monthly period may become irregular. You may also have frequent spotting or light bleeding. A few women have heavy bleeding during this time. After your body adjusts, the number of bleeding days is likely to decrease (but may remain irregular), and you may even find that your periods stop altogether for as long as Mirena is in place. Around the end of the third month of use, you may see up to a 75% reduction in the amount of menstrual bleeding. By one year, about 1 out of 5 users may hay have no period at all. At the end of two years, 70% have little or no bleeding. Your periods will return rapidly once Mirena is removed.     Mirena Strings  You may check your own Mirena strings by inserting a finger into the vagina and feeling the strings as they exit the cervix.  The strings will initially feel firm, like fishing line, but will soften over a few weeks.  After the strings have softened, you or your partner should not be able to feel the strings during intercourse. If your partner continues to feel the strings you can have them shortened by your provider If you can feel the  IUD, see your healthcare provider to have the position confirmed.  You may use tampons with Mirena in place.    Mirena does not protect against HIV or STDs.  Mirena does not prevent the formation of ovarian cysts.  Mirena does not typically reduce acne or cause weight gain or mood changes.    Call the clinic immediately if you:  Notice any change in the length of the strings or can feel part of the IUD  Have pain or bleeding with sex.  Have unusually heavy bleeding from the vagina.   Think you are pregnant  Have been or might have been exposed to a sexually transmitted infection  Have unusual pelvic pain, cramping or soreness in your abdomen  Have unexplained fever or chills.       Please call [facility] [place] [phone]  if you have questions or concerns.    For more information:  [email or web address]

## 2021-08-06 ENCOUNTER — PATIENT OUTREACH (OUTPATIENT)
Dept: OBGYN | Facility: CLINIC | Age: 36
End: 2021-08-06

## 2021-08-06 DIAGNOSIS — R87.810 CERVICAL HIGH RISK HPV (HUMAN PAPILLOMAVIRUS) TEST POSITIVE: ICD-10-CM

## 2021-10-04 ENCOUNTER — HEALTH MAINTENANCE LETTER (OUTPATIENT)
Age: 36
End: 2021-10-04

## 2021-10-26 NOTE — PROGRESS NOTES
SUBJECTIVE:   CC: Isabel Anthony is an 36 year old woman who presents for preventive health visit.       Patient has been advised of split billing requirements and indicates understanding: Yes  HPI        No concerns    Today's PHQ-2 Score:   PHQ-2 ( 1999 [employer]) 8/11/2020   Q1: Little interest or pleasure in doing things 0   Q2: Feeling down, depressed or hopeless 0   PHQ-2 Score 0       Abuse: Current or Past (Physical, Sexual or Emotional) - No  Do you feel safe in your environment? Yes    Have you ever done Advance Care Planning? (For example, a Health Directive, POLST, or a discussion with a medical provider or your loved ones about your wishes):     Social History     Tobacco Use     Smoking status: Never Smoker     Smokeless tobacco: Never Used   Substance Use Topics     Alcohol use: Yes     Alcohol/week: 0.0 standard drinks     Comment: occasional/social     If you drink alcohol do you typically have >3 drinks per day or >7 drinks per week? No    Alcohol Use 1/26/2016   Prescreen: >3 drinks/day or >7 drinks/week? The patient does not drink >3 drinks per day nor >7 drinks per week.   No flowsheet data found.    Reviewed orders with patient.  Reviewed health maintenance and updated orders accordingly - Yes  Lab work is in process    Breast Cancer Screening:  Any new diagnosis of family breast, ovarian, or bowel cancer? No    FHS-7: No flowsheet data found.    Patient under 40 years of age: Routine Mammogram Screening not recommended.   Pertinent mammograms are reviewed under the imaging tab.    History of abnormal Pap smear:   PAP / HPV Latest Ref Rng & Units 8/19/2020 1/26/2016   PAP (Historical) - NIL NIL   HPV16 NEG:Negative Negative -   HPV18 NEG:Negative Negative -   HRHPV NEG:Negative Positive(A) -     Reviewed and updated as needed this visit by clinical staff                 Reviewed and updated as needed this visit by Provider                    Review of Systems  CONSTITUTIONAL: NEGATIVE for  fever, chills, change in weight  INTEGUMENTARU/SKIN: NEGATIVE for worrisome rashes, moles or lesions  EYES: NEGATIVE for vision changes or irritation  ENT: NEGATIVE for ear, mouth and throat problems  RESP: NEGATIVE for significant cough or SOB  BREAST: NEGATIVE for masses, tenderness or discharge  CV: NEGATIVE for chest pain, palpitations or peripheral edema  GI: NEGATIVE for nausea, abdominal pain, heartburn, or change in bowel habits  : NEGATIVE for unusual urinary or vaginal symptoms. Periods are regular.  MUSCULOSKELETAL: NEGATIVE for significant arthralgias or myalgia  NEURO: NEGATIVE for weakness, dizziness or paresthesias  ENDOCRINE: NEGATIVE for temperature intolerance, skin/hair changes  HEME/ALLERGY/IMMUNE: NEGATIVE for bleeding problems  PSYCHIATRIC: NEGATIVE for changes in mood or affect     OBJECTIVE:   There were no vitals taken for this visit.  Physical Exam  GENERAL: healthy, alert and no distress  EYES: Eyes grossly normal to inspection, PERRL and conjunctivae and sclerae normal  HENT: ear canals and TM's normal, nose and mouth without ulcers or lesions  NECK: no adenopathy, no asymmetry, masses, or scars and thyroid normal to palpation  RESP: lungs clear to auscultation - no rales, rhonchi or wheezes  BREAST: normal without masses, tenderness or nipple discharge and no palpable axillary masses or adenopathy  CV: regular rate and rhythm, normal S1 S2, no S3 or S4, no murmur, click or rub, no peripheral edema and peripheral pulses strong  ABDOMEN: soft, nontender, no hepatosplenomegaly, no masses and bowel sounds normal   (female): normal female external genitalia, normal urethral meatus, vaginal mucosa pink, moist, well rugated, and normal cervix/adnexa/uterus without masses or discharge. IUD at os  MS: no gross musculoskeletal defects noted, no edema  SKIN: no suspicious lesions or rashes  NEURO: Normal strength and tone, mentation intact and speech normal  PSYCH: mentation appears normal,  "affect normal/bright    Diagnostic Test Results:  Labs reviewed in Epic  No results found for this or any previous visit (from the past 24 hour(s)).    ASSESSMENT/PLAN:   (Z00.00) Routine general medical examination at a health care facility  (primary encounter diagnosis)  Comment:   Plan: Pap diagnostic with HPV            (R87.810) Cervical high risk HPV (human papillomavirus) test positive  Comment:   Plan: Pap diagnostic with HPV            (Z97.5) IUD (intrauterine device) in place  Comment:   Plan:       COUNSELING:  Reviewed preventive health counseling, as reflected in patient instructions       Contraception       Family planning    Estimated body mass index is 27.24 kg/m  as calculated from the following:    Height as of 8/11/20: 1.619 m (5' 3.75\").    Weight as of 9/3/20: 71.4 kg (157 lb 7 oz).        She reports that she has never smoked. She has never used smokeless tobacco.      Counseling Resources:  ATP IV Guidelines  Pooled Cohorts Equation Calculator  Breast Cancer Risk Calculator  BRCA-Related Cancer Risk Assessment: FHS-7 Tool  FRAX Risk Assessment  ICSI Preventive Guidelines  Dietary Guidelines for Americans, 2010  USDA's MyPlate  ASA Prophylaxis  Lung CA Screening    [facility] [place]  "

## 2021-10-27 ENCOUNTER — OFFICE VISIT (OUTPATIENT)
Dept: OBGYN | Facility: CLINIC | Age: 36
End: 2021-10-27
Payer: COMMERCIAL

## 2021-10-27 VITALS
WEIGHT: 156.38 LBS | DIASTOLIC BLOOD PRESSURE: 80 MMHG | TEMPERATURE: 97.9 F | BODY MASS INDEX: 26.7 KG/M2 | HEART RATE: 60 BPM | HEIGHT: 64 IN | SYSTOLIC BLOOD PRESSURE: 124 MMHG

## 2021-10-27 DIAGNOSIS — Z97.5 IUD (INTRAUTERINE DEVICE) IN PLACE: ICD-10-CM

## 2021-10-27 DIAGNOSIS — R87.810 CERVICAL HIGH RISK HPV (HUMAN PAPILLOMAVIRUS) TEST POSITIVE: ICD-10-CM

## 2021-10-27 DIAGNOSIS — Z00.00 ROUTINE GENERAL MEDICAL EXAMINATION AT A HEALTH CARE FACILITY: Primary | ICD-10-CM

## 2021-10-27 PROCEDURE — 88175 CYTOPATH C/V AUTO FLUID REDO: CPT | Performed by: ADVANCED PRACTICE MIDWIFE

## 2021-10-27 PROCEDURE — 99395 PREV VISIT EST AGE 18-39: CPT | Performed by: ADVANCED PRACTICE MIDWIFE

## 2021-10-27 PROCEDURE — 88141 CYTOPATH C/V INTERPRET: CPT | Performed by: PATHOLOGY

## 2021-10-27 PROCEDURE — 87624 HPV HI-RISK TYP POOLED RSLT: CPT | Performed by: ADVANCED PRACTICE MIDWIFE

## 2021-10-27 ASSESSMENT — MIFFLIN-ST. JEOR: SCORE: 1390.34

## 2021-11-01 LAB
BKR LAB AP GYN ADEQUACY: ABNORMAL
BKR LAB AP GYN INTERPRETATION: ABNORMAL
BKR LAB AP HPV REFLEX: ABNORMAL
BKR LAB AP LMP: ABNORMAL
BKR LAB AP PREVIOUS ABNL DX: ABNORMAL
BKR LAB AP PREVIOUS ABNORMAL: ABNORMAL
PATH REPORT.COMMENTS IMP SPEC: ABNORMAL
PATH REPORT.RELEVANT HX SPEC: ABNORMAL

## 2021-11-03 ENCOUNTER — PATIENT OUTREACH (OUTPATIENT)
Dept: OBGYN | Facility: CLINIC | Age: 36
End: 2021-11-03

## 2021-11-03 ENCOUNTER — TELEPHONE (OUTPATIENT)
Dept: OBGYN | Facility: OTHER | Age: 36
End: 2021-11-03

## 2021-11-03 LAB
HUMAN PAPILLOMA VIRUS 16 DNA: NEGATIVE
HUMAN PAPILLOMA VIRUS 18 DNA: NEGATIVE
HUMAN PAPILLOMA VIRUS FINAL DIAGNOSIS: ABNORMAL
HUMAN PAPILLOMA VIRUS OTHER HR: POSITIVE

## 2021-11-15 ENCOUNTER — OFFICE VISIT (OUTPATIENT)
Dept: OBGYN | Facility: CLINIC | Age: 36
End: 2021-11-15
Payer: COMMERCIAL

## 2021-11-15 VITALS
BODY MASS INDEX: 27.37 KG/M2 | WEIGHT: 158.2 LBS | OXYGEN SATURATION: 100 % | DIASTOLIC BLOOD PRESSURE: 77 MMHG | SYSTOLIC BLOOD PRESSURE: 117 MMHG | HEART RATE: 65 BPM

## 2021-11-15 DIAGNOSIS — Z32.00 PREGNANCY EXAMINATION OR TEST, PREGNANCY UNCONFIRMED: ICD-10-CM

## 2021-11-15 DIAGNOSIS — T83.32XA MALPOSITIONED INTRAUTERINE DEVICE (IUD), INITIAL ENCOUNTER: ICD-10-CM

## 2021-11-15 DIAGNOSIS — R87.810 CERVICAL HIGH RISK HPV (HUMAN PAPILLOMAVIRUS) TEST POSITIVE: Primary | ICD-10-CM

## 2021-11-15 PROBLEM — L68.0 HIRSUTISM: Status: RESOLVED | Noted: 2020-08-11 | Resolved: 2021-11-15

## 2021-11-15 LAB — HCG UR QL: NEGATIVE

## 2021-11-15 PROCEDURE — 88175 CYTOPATH C/V AUTO FLUID REDO: CPT | Performed by: OBSTETRICS & GYNECOLOGY

## 2021-11-15 PROCEDURE — 99214 OFFICE O/P EST MOD 30 MIN: CPT | Mod: 25 | Performed by: OBSTETRICS & GYNECOLOGY

## 2021-11-15 PROCEDURE — 57452 EXAM OF CERVIX W/SCOPE: CPT | Performed by: OBSTETRICS & GYNECOLOGY

## 2021-11-15 PROCEDURE — 81025 URINE PREGNANCY TEST: CPT | Performed by: OBSTETRICS & GYNECOLOGY

## 2021-11-15 PROCEDURE — 88141 CYTOPATH C/V INTERPRET: CPT | Performed by: PATHOLOGY

## 2021-11-15 PROCEDURE — 58301 REMOVE INTRAUTERINE DEVICE: CPT | Performed by: OBSTETRICS & GYNECOLOGY

## 2021-11-15 NOTE — PATIENT INSTRUCTIONS
If you have any questions regarding your visit, Please contact your care team.     [facility]: [phone]  Women s Health CLINIC HOURS TELEPHONE NUMBER       Mac Leroy M.D.    Shanique-REDD Babb-REDD Holguin-Medical Assistant    Georgie-  Annalise-     Monday-[place]  8:00a.m-4:45 p.m  Tuesday-[place]  9:00a.m-4:00 p.m  Wednesday-[place] 8:00a.m-4:45 p.m.  Thursday-[place]  8:00a.m-4:45 p.m.  Friday-[place]  8:00a.m-4:45 p.m. [facility]  [address] [place] [id number] [phone] ask for [facility]  [phone] Fax  Imaging [phone]    [facility]  [address]  [place], [place] [address] [phone]    [facility]  [address] [place]  [place] [address] [phone] ask [facility]  [phone] Fax  Imaging [phone]     Urgent Care locations:    [place]        [place] Monday-Friday  10 am - 8 pm  Saturday and Sunday   9 am - 5 pm  Monday-Friday   10 am - 8 pm  Saturday and Sunday   9 am - 5 pm   [phone]   If you need a medication refill, please contact your pharmacy. Please allow 3 business days for your refill to be completed.  As always, Thank you for trusting us with your healthcare needs!

## 2021-11-16 NOTE — PROGRESS NOTES
OB-GYN Problem-Oriented Visit or Consultation      Isabel Anthony is a 36 year old year old P 2 who presents with a chief complaint of HRHPV positive.  Referred by Suni Orta.  Patient's last menstrual period was 10/21/2021 (exact date).    Assessment:   Encounter Diagnoses   Name Primary?     Cervical high risk HPV (human papillomavirus) test positive Yes     Pregnancy examination or test, pregnancy unconfirmed      Malpositioned intrauterine device (IUD), initial encounter          Plan and Recommendations: Await Pap and if neg or low grade then observe and follow-up Pap/HRHPV in 1 yr.   Discussed contraceptive options and considering another Mirena IUD or sterilization options.   I reviewed the condition, causes, differential diagnosis, prognosis, evaluation and management considerations and options.  Questions answered and information given. See orders.   30 minutes spent on the date of encounter doing chart review, history, examination, discussion with patient, and documentation, and further activities as noted, and review of appropriateness of decision-making for care, and review of test results, and interpretation of test results. This includes evaluation and management time spent beyond specific discussion of and performance of the procedure itself.        A/P:  Isabel was seen today for colposcopy.    Diagnoses and all orders for this visit:    Cervical high risk HPV (human papillomavirus) test positive  -     Pap diagnostic only  -     COLP CERVIX/UPPER VAGINA    Pregnancy examination or test, pregnancy unconfirmed  -     HCG Qual, Urine ([id number])    Malpositioned intrauterine device (IUD), initial encounter  -     REMOVE INTRAUTERINE DEVICE        Mac Leroy MD    HPI:     Menses q 28-30 days x 4-5 days and lighter with Mirena but had prolonged spotting and mild cramping for 8d after LMP. Thought she might be feeling the IUD at the cervix now. Contraceptive method is IUD. This Mirena in place  for 1 yr. UPT today is neg.     1/26/16 NIL pap  8/19/20 NIL pap, + HR HPV (not 16 or 18). Plan: cotest due in 1 yr.   10/08/21 Lost to follow-up for pap tracking   10/27/21 ASCUS pap, + HR HPV (not 16 or 18). Plan: [place] bef 1/27/22  11/3/21 left msg and sent my chart result note. Pt viewed message.   11/15/21 COLP     Non-smoker. Getting  x 4 yrs but in a new relationship x 2 yrs.    Past medical, obstetrical, surgical, family and social history reviewed and as noted or updated in chart.     Allergies, meds and supplements are as noted or updated in chart.      ROS:   Systems reviewed include:  constitutional, gastrointestinal, genitourinary, integumentary, psychological, hematologic/lymphatic and endocrine.    These systems were negative for significant symptoms except for the following additional: none; see HPI.    EXAM:  VS as noted. /77 (BP Location: Left arm, Patient Position: Sitting, Cuff Size: Adult Regular)   Pulse 65   Wt 71.8 kg (158 lb 3.2 oz)   LMP 10/21/2021 (Exact Date)   SpO2 100%   BMI 27.37 kg/m    Constitutionally normal.     Vulva and vagina normal with minimal discharge, IUD is protruding from the cervix.       PROCEDURE: IUD Removal    I discussed the removal procedure, objectives, risks, complications and future contraceptive methods as indicated.  Patient understood and desired to proceed.    After appropriate preparation, the Mirena IUD was removed intact. No complications. The arms were somewhat stuck at the endocervix but removed intact. Patient tolerated the procedure well. Stable at discharge. Instructions and information were given.    Procedure:   Discussed procedure, indications, risks, benefits and alternatives of colposcopy. Patient understood and desired to proceed. Preliminary endocervical cytology btained. The cervix was inspected using acetic acid and Lugol's solution. The exam was satisfactory. Endocervical speculum not used. Findings: Cervix: No lesion  seen. Mild cervicitis.   Anesthesia: none.  Procedure Summary: Patient tolerated procedure well. Complications: none.  Colposcopic Impression: Micro HPV effect.   Plan: Anticipate observation and follow-up.    Mac Leroy MD

## 2021-11-18 LAB
BKR LAB AP GYN ADEQUACY: ABNORMAL
BKR LAB AP GYN INTERPRETATION: ABNORMAL
BKR LAB AP HPV REFLEX: NO
BKR LAB AP PREVIOUS ABNL DX: ABNORMAL
BKR LAB AP PREVIOUS ABNORMAL: ABNORMAL
PATH REPORT.COMMENTS IMP SPEC: ABNORMAL
PATH REPORT.COMMENTS IMP SPEC: ABNORMAL
PATH REPORT.RELEVANT HX SPEC: ABNORMAL

## 2021-11-19 ENCOUNTER — PATIENT OUTREACH (OUTPATIENT)
Dept: OBGYN | Facility: CLINIC | Age: 36
End: 2021-11-19
Payer: COMMERCIAL

## 2021-11-19 NOTE — RESULT ENCOUNTER NOTE
ASCUS pap letter sent through My Chart results. Plan to repeat pap and HPV in 1 year.     Jordyn Dorman, RN, PHN, BSN  Pap Tracking Nurse

## 2021-12-13 ENCOUNTER — OFFICE VISIT (OUTPATIENT)
Dept: OBGYN | Facility: CLINIC | Age: 36
End: 2021-12-13
Payer: COMMERCIAL

## 2021-12-13 VITALS
SYSTOLIC BLOOD PRESSURE: 120 MMHG | BODY MASS INDEX: 27.37 KG/M2 | DIASTOLIC BLOOD PRESSURE: 74 MMHG | HEART RATE: 63 BPM | WEIGHT: 158.2 LBS | OXYGEN SATURATION: 96 %

## 2021-12-13 DIAGNOSIS — Z32.00 PREGNANCY EXAMINATION OR TEST, PREGNANCY UNCONFIRMED: Primary | ICD-10-CM

## 2021-12-13 DIAGNOSIS — Z30.430 ENCOUNTER FOR INSERTION OF INTRAUTERINE CONTRACEPTIVE DEVICE: ICD-10-CM

## 2021-12-13 LAB — HCG UR QL: NEGATIVE

## 2021-12-13 PROCEDURE — 81025 URINE PREGNANCY TEST: CPT | Performed by: OBSTETRICS & GYNECOLOGY

## 2021-12-13 PROCEDURE — 58300 INSERT INTRAUTERINE DEVICE: CPT | Performed by: OBSTETRICS & GYNECOLOGY

## 2021-12-13 NOTE — PATIENT INSTRUCTIONS
If you have any questions regarding your visit, Please contact your care team.     [facility] Services: [phone]  Women s Health CLINIC HOURS TELEPHONE NUMBER       Mac Leroy M.D.    Shanique-REDD Babb-REDD Holguin-Medical Assistant    Georgie-  Annalise-     Monday-[place]  8:00a.m-4:45 p.m  Tuesday-[place]  9:00a.m-4:00 p.m  Wednesday-[place] 8:00a.m-4:45 p.m.  Thursday-[place]  8:00a.m-4:45 p.m.  Friday-[place]  8:00a.m-4:45 p.m. [facility]  [address] [facility] [place] [address] [phone] ask for [facility]  [phone] Fax  Imaging [phone]    [facility]  [address]  [place], [place] [address] [phone]    [facility]  [address] [place]  [place] [address] [phone] ask [facility]  [phone] Fax  Imaging [phone]     Urgent Care locations:    [place]        [place] Monday-Friday  10 am - 8 pm  Saturday and Sunday   9 am - 5 pm  Monday-Friday   10 am - 8 pm  Saturday and Sunday   9 am - 5 pm   [phone]   If you need a medication refill, please contact your pharmacy. Please allow 3 business days for your refill to be completed.  As always, Thank you for trusting us with your healthcare needs!

## 2021-12-13 NOTE — PROGRESS NOTES
Isabel Anthony is a 36 year old  who desires Mirena IUD. Last one needed removal. Condoms used in the interval.   She feels well and denies signif sx. No contraindications upon review. Patient's last menstrual period was 2021.  UPT neg. See prior notes. Pap/HRHPV recheck planned in 1 yr.     Past medical, obstetrical, surgical, family and social history reviewed and as noted or updated in chart.     IUD contraception and insertion procedure discussed including method, effectiveness, limitations, proper use, risks, benefits and alternatives.  She has reviewed the package insert information and additional educational materials and desires to proceed.     PROCEDURE:    Mirena Insertion    A preliminary bimanual exam was performed and was normal, uterus anterior. The cervix was cleansed with Betadine. A cervical tenaculum was placed and the uterus sounded to 8 cm. Maintaining sterile technique, the IUD was inserted into the uterine cavity and the strings trimmed to 2.5 cm. No complications. Patient tolerated the procedure well.   NDC: 85224-380-99. Lot: [id number]. Exp: 2023.    Instructions and precautions reviewed. RTC in 5 weeks for recheck.      Assessment:   Encounter Diagnoses   Name Primary?     Pregnancy examination or test, pregnancy unconfirmed Yes     Encounter for insertion of intrauterine contraceptive device      A/P:  Isabel was seen today for iud.    Diagnoses and all orders for this visit:    Pregnancy examination or test, pregnancy unconfirmed  -     HCG Qual, Urine ([id number])    Encounter for insertion of intrauterine contraceptive device  -     INSERTION INTRAUTERINE DEVICE  -     levonorgestrel (MIRENA) 20 MCG/24HR IUD 20 mcg          Mac Leroy MD

## 2022-09-11 ENCOUNTER — HEALTH MAINTENANCE LETTER (OUTPATIENT)
Age: 37
End: 2022-09-11

## 2022-11-01 ENCOUNTER — PATIENT OUTREACH (OUTPATIENT)
Dept: FAMILY MEDICINE | Facility: CLINIC | Age: 37
End: 2022-11-01

## 2022-11-01 DIAGNOSIS — R87.810 CERVICAL HIGH RISK HPV (HUMAN PAPILLOMAVIRUS) TEST POSITIVE: ICD-10-CM

## 2022-12-30 NOTE — TELEPHONE ENCOUNTER
Dr. Leroy,    Patient is lost to pap tracking follow-up. Attempts to contact pt have been made per reminder process and there has been no reply and/or no appt scheduled.  If you are wanting any additional contact attempts please send to your care team staff.     Hazel Hayes RN  Pap Tracking     8/19/20 NIL pap, + HR HPV (not 16 or 18). Plan: cotest due in 1 yr.   10/08/21 Lost to follow-up for pap tracking   10/27/21 ASCUS pap, + HR HPV (not 16 or 18). Plan: [place] bef 1/27/22  11/15/21 [place] without bx. Pap: ASCUS. Plan: Cotest in 1 yr.   11/1/22 Reminder mychart  12/1/22 Reminder call. Left msg  12/30/2022 Lost to follow-up for pap tracking

## 2023-01-22 ENCOUNTER — HEALTH MAINTENANCE LETTER (OUTPATIENT)
Age: 38
End: 2023-01-22

## 2024-05-04 ENCOUNTER — HEALTH MAINTENANCE LETTER (OUTPATIENT)
Age: 39
End: 2024-05-04

## 2025-05-17 ENCOUNTER — HEALTH MAINTENANCE LETTER (OUTPATIENT)
Age: 40
End: 2025-05-17